# Patient Record
(demographics unavailable — no encounter records)

---

## 2025-05-19 NOTE — DATA REVIEWED
[FreeTextEntry1] : On my interpretations of these images from [facility] in house on 05/19/2025: I have independently reviewed and interpreted these images. 2 v lumbar XR- Grade 1 listhesis L4 on L5, superior end plate deformity L3, superior endplate compression deformity of L1, spinal asymmetry.   [facility] 3/1/2016 lumbar XRs- L3 superior endplate compression FX.

## 2025-05-19 NOTE — DISCUSSION/SUMMARY
[de-identified] : 84 Y M w/ known HX of prior L3 superior endplate compression fracture, recent sudden increase in back pains s/p lifting 20 lbs, I am requesting a lumbar MRI to eval chronicity of L1 superior endplate compression fracture.  Discussed non-operative (NSAIDs, bracing, ice/heat, rest) and operative treatment measures (kyphoplasty) of compression deformities.  In house XRs reviewed & discussed with patient & son-in law today. Pt will buy LSO brace from outside facility.   F/U in 1-2 weeks to review MRI.   Prior to appointment and during encounter with patient extensive medical records were reviewed including but not limited to, hospital records, out patient records, imaging results, and lab data. During this appointment the patient was examined, diagnoses were discussed and explained in a face to face manner. In addition extensive time was spent reviewing aforementioned diagnostic studies. Counseling including abnormal image results, differential diagnoses, treatment options, risk and benefits, lifestyle changes, current condition, and current medications was performed. Patient's comments, questions, and concerns were address and patient verbalized understanding. Based on this patient's presentation at our office, which is an orthopedic spine surgeon's office, this patient inherently / intrinsically has a risk, however minute, of developing issues such as Cauda equina syndrome, bowel and bladder changes, or progression of motor or neurological deficits such as paralysis which may be permanent.    I, Zoey Guerra, attest that this documentation has been prepared under the direction and in the presence of provider Dawit Barrera MD.

## 2025-05-19 NOTE — DATA REVIEWED
[FreeTextEntry1] : On my interpretations of these images from [facility] in house on 05/19/2025: I have independently reviewed and interpreted these images. 2 v lumbar XR- Grade 1 listhesis L4 on L5, superior end plate deformity L3, superior endplate compression deformity of L1, spinal asymmetry.   [facility] 3/1/2016 lumbar XRs- L3 superior endplate compression FX.

## 2025-05-19 NOTE — HISTORY OF PRESENT ILLNESS
[de-identified] : 05/19/2025:  84 Y M presenting today for an initial evaluation. Pt reports on 5/11/25, he picked up potted soil bag of ~20 lbs, leaned over, and reports sudden, sharp RT sided back pain. Pain was not significant that night, painful the following morning.  Back pains are greatest when ambulating up from seated position. Exacerbation when lying supine. Seen ~7-8 yrs ago by Dr. Wallace, lifting boat trailer, pt reports it resulted in multiple compression deformities.  No pain, numbness, tingling or weakness in the lower extremities b/l. c PMHx: Quadruple bypass, on Plavix.   The patient is a 84 year male who presents today complaining of low back pain Date of Injury/Onset: 5/10/2025 Pain:    At Rest: 5-6/10  With Activity:  7-8/10  Mechanism of injury: lifting a 20 pound bag of garden soil Quality of symptoms: sharp Improves with: nothing Worse with: movement Prior treatment: no Prior Imaging: no Additional Information:

## 2025-05-19 NOTE — DISCUSSION/SUMMARY
[de-identified] : 84 Y M w/ known HX of prior L3 superior endplate compression fracture, recent sudden increase in back pains s/p lifting 20 lbs, I am requesting a lumbar MRI to eval chronicity of L1 superior endplate compression fracture.  Discussed non-operative (NSAIDs, bracing, ice/heat, rest) and operative treatment measures (kyphoplasty) of compression deformities.  In house XRs reviewed & discussed with patient & son-in law today. Pt will buy LSO brace from outside facility.   F/U in 1-2 weeks to review MRI.   Prior to appointment and during encounter with patient extensive medical records were reviewed including but not limited to, hospital records, out patient records, imaging results, and lab data. During this appointment the patient was examined, diagnoses were discussed and explained in a face to face manner. In addition extensive time was spent reviewing aforementioned diagnostic studies. Counseling including abnormal image results, differential diagnoses, treatment options, risk and benefits, lifestyle changes, current condition, and current medications was performed. Patient's comments, questions, and concerns were address and patient verbalized understanding. Based on this patient's presentation at our office, which is an orthopedic spine surgeon's office, this patient inherently / intrinsically has a risk, however minute, of developing issues such as Cauda equina syndrome, bowel and bladder changes, or progression of motor or neurological deficits such as paralysis which may be permanent.    I, Zoey Guerra, attest that this documentation has been prepared under the direction and in the presence of provider Dawit Barrera MD.

## 2025-05-19 NOTE — HISTORY OF PRESENT ILLNESS
[de-identified] : 05/19/2025:  84 Y M presenting today for an initial evaluation. Pt reports on 5/11/25, he picked up potted soil bag of ~20 lbs, leaned over, and reports sudden, sharp RT sided back pain. Pain was not significant that night, painful the following morning.  Back pains are greatest when ambulating up from seated position. Exacerbation when lying supine. Seen ~7-8 yrs ago by Dr. Wallace, lifting boat trailer, pt reports it resulted in multiple compression deformities.  No pain, numbness, tingling or weakness in the lower extremities b/l. c PMHx: Quadruple bypass, on Plavix.   The patient is a 84 year male who presents today complaining of low back pain Date of Injury/Onset: 5/10/2025 Pain:    At Rest: 5-6/10  With Activity:  7-8/10  Mechanism of injury: lifting a 20 pound bag of garden soil Quality of symptoms: sharp Improves with: nothing Worse with: movement Prior treatment: no Prior Imaging: no Additional Information:

## 2025-05-19 NOTE — PHYSICAL EXAM
[de-identified] : Constitutional: - General Appearance: Unremarkable Body Habitus Well Developed Well Nourished Body Habitus No Deformities Well Groomed Ability To communicate: Normal Neurologic: Global sensation is intact to upper and lower extremities. See examination of Neck and/or Spine for exceptions. Orientation to Time, Place and Person is: Normal Mood And Affect is Normal Skin: - Head/Face, Right Upper/Lower Extremity, Left Upper/Lower Extremity: Normal See Examination of Neck and/or Spine for exceptions Cardiovascular: Peripheral Cardiovascular System is Normal Palpation of Lymph Nodes: Normal Palpation of lymph nodes in: Axilla, Cervical, Inguinal Abnormal Palpation of lymph nodes in: None  [] : negative sitting straight leg raise

## 2025-05-19 NOTE — PHYSICAL EXAM
[de-identified] : Constitutional: - General Appearance: Unremarkable Body Habitus Well Developed Well Nourished Body Habitus No Deformities Well Groomed Ability To communicate: Normal Neurologic: Global sensation is intact to upper and lower extremities. See examination of Neck and/or Spine for exceptions. Orientation to Time, Place and Person is: Normal Mood And Affect is Normal Skin: - Head/Face, Right Upper/Lower Extremity, Left Upper/Lower Extremity: Normal See Examination of Neck and/or Spine for exceptions Cardiovascular: Peripheral Cardiovascular System is Normal Palpation of Lymph Nodes: Normal Palpation of lymph nodes in: Axilla, Cervical, Inguinal Abnormal Palpation of lymph nodes in: None  [] : non-antalgic

## 2025-05-19 NOTE — REASON FOR VISIT
[Family Member] : family member [FreeTextEntry2] : New Patient: lumbar Spine        Pt would like to go home.

## 2025-06-10 NOTE — DATA REVIEWED
[FreeTextEntry1] : I have independently reviewed and interpreted these images and reports. Stand Up MRI. Transitional segment at L5-S1, sacralized L5.  L4-5: Grade 1 listhesis, results in a moderate BL FS.  L3-4 chronic compression superior deformity L2- NL.  L1 acute vs subacute superior endplate deformity w/ 50% loss of height mild retropulsion w/o any compression.    On my interpretations of these images from [facility] in house on 05/19/2025: I have independently reviewed and interpreted these images. 2 v lumbar XR- Grade 1 listhesis L4 on L5, superior end plate deformity L3, superior endplate compression deformity of L1, spinal asymmetry.   [facility] 3/1/2016 lumbar XRs- L3 superior endplate compression FX.

## 2025-06-10 NOTE — HISTORY OF PRESENT ILLNESS
[Lower back] : lower back [10] : 10 [9] : 9 [Localized] : localized [Nothing helps with pain getting better] : Nothing helps with pain getting better [Standing] : standing [Walking] : walking [de-identified] : 06/06/2025: Patient presenting today for an MRI results review. Accompanied by family member. He reports significant pain. 85-90% of time his pains are a 10/10. Mild relief when sitting on heating pad for about 1 hour. Exacerbation with change in symptoms. He reports loss of appetite with pain medication but otherwise it is not providing significant relief.   05/19/2025:  84 Y M presenting today for an initial evaluation. Pt reports on 5/11/25, he picked up potted soil bag of ~20 lbs, leaned over, and reports sudden, sharp RT sided back pain. Pain was not significant that night, painful the following morning.  Back pains are greatest when ambulating up from seated position. Exacerbation when lying supine. Seen ~7-8 yrs ago by Dr. Wallace, lifting boat trailer, pt reports it resulted in multiple compression deformities.  No pain, numbness, tingling or weakness in the lower extremities b/l. c PMHx: Quadruple bypass, on Plavix.   The patient is a 84 year male who presents today complaining of low back pain Date of Injury/Onset: 5/10/2025 Pain:    At Rest: 5-6/10  With Activity:  7-8/10  Mechanism of injury: lifting a 20 pound bag of garden soil Quality of symptoms: sharp Improves with: nothing Worse with: movement Prior treatment: no Prior Imaging: no Additional Information:   [] : no [de-identified] : getting up initially [de-identified] : MRI Stand up

## 2025-06-10 NOTE — DISCUSSION/SUMMARY
[de-identified] : 84 Y M w/ L1 acute vs subacute superior endplate deformity & chronic L3 superior endplate compression fracture. Episode onset 5/11/25. No significant reduction in pains since start of episode. Stand Up MRI reviewed & discussed with patient today.  Discussed non-operative (NSAIDs, bracing, ice/heat, rest) and operative treatment measures (kyphoplasty) of compression deformities.   Patient was educated and informed on their condition along with the expected outcomes.  All questions answered. Renewed Oxycodone 2 tablets every 6 hours. He will see interventional pain mgmt and proceed with kyphoplasty.   F/U in 1 month.   Prior to appointment and during encounter with patient extensive medical records were reviewed including but not limited to, hospital records, out patient records, imaging results, and lab data. During this appointment the patient was examined, diagnoses were discussed and explained in a face to face manner. In addition extensive time was spent reviewing aforementioned diagnostic studies. Counseling including abnormal image results, differential diagnoses, treatment options, risk and benefits, lifestyle changes, current condition, and current medications was performed. Patient's comments, questions, and concerns were address and patient verbalized understanding. Based on this patient's presentation at our office, which is an orthopedic spine surgeon's office, this patient inherently / intrinsically has a risk, however minute, of developing issues such as Cauda equina syndrome, bowel and bladder changes, or progression of motor or neurological deficits such as paralysis which may be permanent.    I, Zoey Guerra, attest that this documentation has been prepared under the direction and in the presence of provider Dawit Barrera MD.

## 2025-06-10 NOTE — DATA REVIEWED
[FreeTextEntry1] : I have independently reviewed and interpreted these images and reports. Stand Up MRI. Transitional segment at L5-S1, sacralized L5.  L4-5: Grade 1 listhesis, results in a moderate BL FS.  L3-4 chronic compression superior deformity L2- NL.  L1 acute vs subacute superior endplate deformity w/ 50% loss of height mild retropulsion w/o any compression.    On my interpretations of these images from [facility] in house on 05/19/2025: I have independently reviewed and interpreted these images. 2 v lumbar XR- Grade 1 listhesis L4 on L5, superior end plate deformity L3, superior endplate compression deformity of L1, spinal asymmetry.   [facility] 3/1/2016 lumbar XRs- L3 superior endplate compression FX.

## 2025-06-10 NOTE — PHYSICAL EXAM
[de-identified] : Constitutional: - General Appearance: Unremarkable Body Habitus Well Developed Well Nourished Body Habitus No Deformities Well Groomed Ability To communicate: Normal Neurologic: Global sensation is intact to upper and lower extremities. See examination of Neck and/or Spine for exceptions. Orientation to Time, Place and Person is: Normal Mood And Affect is Normal Skin: - Head/Face, Right Upper/Lower Extremity, Left Upper/Lower Extremity: Normal See Examination of Neck and/or Spine for exceptions Cardiovascular: Peripheral Cardiovascular System is Normal Palpation of Lymph Nodes: Normal Palpation of lymph nodes in: Axilla, Cervical, Inguinal Abnormal Palpation of lymph nodes in: None  [] : non-antalgic

## 2025-06-10 NOTE — DISCUSSION/SUMMARY
[de-identified] : 84 Y M w/ L1 acute vs subacute superior endplate deformity & chronic L3 superior endplate compression fracture. Episode onset 5/11/25. No significant reduction in pains since start of episode. Stand Up MRI reviewed & discussed with patient today.  Discussed non-operative (NSAIDs, bracing, ice/heat, rest) and operative treatment measures (kyphoplasty) of compression deformities.   Patient was educated and informed on their condition along with the expected outcomes.  All questions answered. Renewed Oxycodone 2 tablets every 6 hours. He will see interventional pain mgmt and proceed with kyphoplasty.   F/U in 1 month.   Prior to appointment and during encounter with patient extensive medical records were reviewed including but not limited to, hospital records, out patient records, imaging results, and lab data. During this appointment the patient was examined, diagnoses were discussed and explained in a face to face manner. In addition extensive time was spent reviewing aforementioned diagnostic studies. Counseling including abnormal image results, differential diagnoses, treatment options, risk and benefits, lifestyle changes, current condition, and current medications was performed. Patient's comments, questions, and concerns were address and patient verbalized understanding. Based on this patient's presentation at our office, which is an orthopedic spine surgeon's office, this patient inherently / intrinsically has a risk, however minute, of developing issues such as Cauda equina syndrome, bowel and bladder changes, or progression of motor or neurological deficits such as paralysis which may be permanent.    I, Zoey Guerra, attest that this documentation has been prepared under the direction and in the presence of provider Dawit Barrera MD.

## 2025-06-10 NOTE — HISTORY OF PRESENT ILLNESS
[Lower back] : lower back [10] : 10 [9] : 9 [Localized] : localized [Nothing helps with pain getting better] : Nothing helps with pain getting better [Standing] : standing [Walking] : walking [de-identified] : 06/06/2025: Patient presenting today for an MRI results review. Accompanied by family member. He reports significant pain. 85-90% of time his pains are a 10/10. Mild relief when sitting on heating pad for about 1 hour. Exacerbation with change in symptoms. He reports loss of appetite with pain medication but otherwise it is not providing significant relief.   05/19/2025:  84 Y M presenting today for an initial evaluation. Pt reports on 5/11/25, he picked up potted soil bag of ~20 lbs, leaned over, and reports sudden, sharp RT sided back pain. Pain was not significant that night, painful the following morning.  Back pains are greatest when ambulating up from seated position. Exacerbation when lying supine. Seen ~7-8 yrs ago by Dr. Wallace, lifting boat trailer, pt reports it resulted in multiple compression deformities.  No pain, numbness, tingling or weakness in the lower extremities b/l. c PMHx: Quadruple bypass, on Plavix.   The patient is a 84 year male who presents today complaining of low back pain Date of Injury/Onset: 5/10/2025 Pain:    At Rest: 5-6/10  With Activity:  7-8/10  Mechanism of injury: lifting a 20 pound bag of garden soil Quality of symptoms: sharp Improves with: nothing Worse with: movement Prior treatment: no Prior Imaging: no Additional Information:   [de-identified] : getting up initially [] : no [de-identified] : MRI Stand up

## 2025-06-10 NOTE — PHYSICAL EXAM
[de-identified] : Constitutional: - General Appearance: Unremarkable Body Habitus Well Developed Well Nourished Body Habitus No Deformities Well Groomed Ability To communicate: Normal Neurologic: Global sensation is intact to upper and lower extremities. See examination of Neck and/or Spine for exceptions. Orientation to Time, Place and Person is: Normal Mood And Affect is Normal Skin: - Head/Face, Right Upper/Lower Extremity, Left Upper/Lower Extremity: Normal See Examination of Neck and/or Spine for exceptions Cardiovascular: Peripheral Cardiovascular System is Normal Palpation of Lymph Nodes: Normal Palpation of lymph nodes in: Axilla, Cervical, Inguinal Abnormal Palpation of lymph nodes in: None  [] : non-antalgic

## 2025-07-18 NOTE — DATA REVIEWED
[FreeTextEntry1] : On my interpretations of these images from [facility] in [place] on 07/18/2025: I have independently reviewed and interpreted these images. 2 v lumbar XR: compression deformity L1 and L1. Multi-level degenerative disease   I have independently reviewed and interpreted these images and reports. Stand Up MRI. Transitional segment at L5-S1, sacralized L5.  L4-5: Grade 1 listhesis, results in a moderate BL FS.  L3-4 chronic compression superior deformity L2- NL.  L1 acute vs subacute superior endplate deformity w/ 50% loss of height mild retropulsion w/o any compression.    On my interpretations of these images from [facility] in [place] on 05/19/2025: I have independently reviewed and interpreted these images. 2 v lumbar XR- Grade 1 listhesis L4 on L5, superior end plate deformity L3, superior endplate compression deformity of L1, spinal asymmetry.   [facility] 3/1/2016 lumbar XRs- L3 superior endplate compression FX.

## 2025-07-18 NOTE — DISCUSSION/SUMMARY
[de-identified] : 84 Y M w/ L1 and L3 compression deformities. Episode onset 5/11/25. Patient reports his pain has been trending in a more positive direction. Lumbar Spine XR taken today reviewed & discussed with patient.  Discussed non-operative (NSAIDs, bracing, ice/heat, rest) and operative treatment measures (kyphoplasty) of compression deformities.   Patient was educated and informed on their condition along with the expected outcomes.  All questions answered. Patient does not wish to take medications for his pain and defers kyphoplasty at this time.   F/U in 1 month.    Prior to appointment and during encounter with patient extensive medical records were reviewed including but not limited to, hospital records, out patient records, imaging results, and lab data. During this appointment the patient was examined, diagnoses were discussed and explained in a face to face manner. In addition extensive time was spent reviewing aforementioned diagnostic studies. Counseling including abnormal image results, differential diagnoses, treatment options, risk and benefits, lifestyle changes, current condition, and current medications was performed. Patient's comments, questions, and concerns were address and patient verbalized understanding. Based on this patient's presentation at our office, which is an orthopedic spine surgeon's office, this patient inherently / intrinsically has a risk, however minute, of developing issues such as Cauda equina syndrome, bowel and bladder changes, or progression of motor or neurological deficits such as paralysis which may be permanent.    I, Marilia Zeng, attest that this documentation has been prepared under the direction and in the presence of provider Dawit Barrera MD.

## 2025-07-18 NOTE — HISTORY OF PRESENT ILLNESS
[5] : 5 [0] : 0 [Rest] : rest [Sitting] : sitting [Walking] : walking [Retired] : Work status: retired [de-identified] : 07/18/2025 - Patient returns for follow up visit of back pain for 2 months. Accompanied by family member. Reports the severity of his pain is improved since his previous visit. However, he reports set back after vacuuming several days ago. Did not proceed with kyphoplasty procedure. He is not taking medications for pain due to lack of effiacy and side effects. Pain travels up to 5-6/10 while standing and banding, but at rest it is described as significantly lower. Feels he is 70% improved since onset.   06/06/2025: Patient presenting today for an MRI results review. Accompanied by family member. He reports significant pain. 85-90% of time his pains are a 10/10. Mild relief when sitting on heating pad for about 1 hour. Exacerbation with change in symptoms. He reports loss of appetite with pain medication but otherwise it is not providing significant relief.   05/19/2025:  84 Y M presenting today for an initial evaluation. Pt reports on 5/11/25, he picked up potted soil bag of ~20 lbs, leaned over, and reports sudden, sharp RT sided back pain. Pain was not significant that night, painful the following morning.  Back pains are greatest when ambulating up from seated position. Exacerbation when lying supine. Seen ~7-8 yrs ago by Dr. Wallace, lifting boat trailer, pt reports it resulted in multiple compression deformities.  No pain, numbness, tingling or weakness in the lower extremities b/l. c PMHx: Quadruple bypass, on Plavix.   The patient is a 84 year male who presents today complaining of low back pain Date of Injury/Onset: 5/10/2025 Pain:    At Rest: 5-6/10  With Activity:  7-8/10  Mechanism of injury: lifting a 20 pound bag of garden soil Quality of symptoms: sharp Improves with: nothing Worse with: movement Prior treatment: no Prior Imaging: no Additional Information:    [] : no [de-identified] : no

## 2025-07-18 NOTE — PHYSICAL EXAM
[de-identified] : Constitutional: - General Appearance: Unremarkable Body Habitus Well Developed Well Nourished Body Habitus No Deformities Well Groomed Ability To communicate: Normal Neurologic: Global sensation is intact to upper and lower extremities. See examination of Neck and/or Spine for exceptions. Orientation to Time, Place and Person is: Normal Mood And Affect is Normal Skin: - Head/Face, Right Upper/Lower Extremity, Left Upper/Lower Extremity: Normal See Examination of Neck and/or Spine for exceptions Cardiovascular: Peripheral Cardiovascular System is Normal Palpation of Lymph Nodes: Normal Palpation of lymph nodes in: Axilla, Cervical, Inguinal Abnormal Palpation of lymph nodes in: None  [] : non-antalgic